# Patient Record
Sex: FEMALE | Race: WHITE | NOT HISPANIC OR LATINO | URBAN - METROPOLITAN AREA
[De-identification: names, ages, dates, MRNs, and addresses within clinical notes are randomized per-mention and may not be internally consistent; named-entity substitution may affect disease eponyms.]

---

## 2022-07-10 ENCOUNTER — EMERGENCY (EMERGENCY)
Facility: HOSPITAL | Age: 62
LOS: 1 days | Discharge: HOME | End: 2022-07-10
Attending: EMERGENCY MEDICINE | Admitting: STUDENT IN AN ORGANIZED HEALTH CARE EDUCATION/TRAINING PROGRAM
Payer: COMMERCIAL

## 2022-07-10 VITALS
WEIGHT: 95.02 LBS | TEMPERATURE: 98 F | RESPIRATION RATE: 20 BRPM | DIASTOLIC BLOOD PRESSURE: 71 MMHG | HEART RATE: 72 BPM | OXYGEN SATURATION: 99 % | SYSTOLIC BLOOD PRESSURE: 104 MMHG | HEIGHT: 60 IN

## 2022-07-10 VITALS
TEMPERATURE: 98 F | DIASTOLIC BLOOD PRESSURE: 65 MMHG | HEART RATE: 73 BPM | RESPIRATION RATE: 18 BRPM | SYSTOLIC BLOOD PRESSURE: 100 MMHG | OXYGEN SATURATION: 100 %

## 2022-07-10 LAB
ALBUMIN SERPL ELPH-MCNC: 4.5 G/DL — SIGNIFICANT CHANGE UP (ref 3.3–5)
ALP SERPL-CCNC: 67 U/L — SIGNIFICANT CHANGE UP (ref 40–120)
ALT FLD-CCNC: 21 U/L — SIGNIFICANT CHANGE UP (ref 10–45)
ANION GAP SERPL CALC-SCNC: 10 MMOL/L — SIGNIFICANT CHANGE UP (ref 5–17)
AST SERPL-CCNC: 26 U/L — SIGNIFICANT CHANGE UP (ref 10–40)
BASOPHILS # BLD AUTO: 0.02 K/UL — SIGNIFICANT CHANGE UP (ref 0–0.2)
BASOPHILS NFR BLD AUTO: 0.3 % — SIGNIFICANT CHANGE UP (ref 0–2)
BILIRUB SERPL-MCNC: 0.3 MG/DL — SIGNIFICANT CHANGE UP (ref 0.2–1.2)
BUN SERPL-MCNC: 17 MG/DL — SIGNIFICANT CHANGE UP (ref 7–23)
CALCIUM SERPL-MCNC: 8.8 MG/DL — SIGNIFICANT CHANGE UP (ref 8.4–10.5)
CHLORIDE SERPL-SCNC: 105 MMOL/L — SIGNIFICANT CHANGE UP (ref 96–108)
CO2 SERPL-SCNC: 24 MMOL/L — SIGNIFICANT CHANGE UP (ref 22–31)
CREAT SERPL-MCNC: 0.48 MG/DL — LOW (ref 0.5–1.3)
EGFR: 107 ML/MIN/1.73M2 — SIGNIFICANT CHANGE UP
EOSINOPHIL # BLD AUTO: 0.03 K/UL — SIGNIFICANT CHANGE UP (ref 0–0.5)
EOSINOPHIL NFR BLD AUTO: 0.5 % — SIGNIFICANT CHANGE UP (ref 0–6)
GLUCOSE SERPL-MCNC: 91 MG/DL — SIGNIFICANT CHANGE UP (ref 70–99)
HCT VFR BLD CALC: 38.3 % — SIGNIFICANT CHANGE UP (ref 34.5–45)
HGB BLD-MCNC: 12.7 G/DL — SIGNIFICANT CHANGE UP (ref 11.5–15.5)
IMM GRANULOCYTES NFR BLD AUTO: 0.3 % — SIGNIFICANT CHANGE UP (ref 0–1.5)
LYMPHOCYTES # BLD AUTO: 1 K/UL — SIGNIFICANT CHANGE UP (ref 1–3.3)
LYMPHOCYTES # BLD AUTO: 15.3 % — SIGNIFICANT CHANGE UP (ref 13–44)
MAGNESIUM SERPL-MCNC: 2.2 MG/DL — SIGNIFICANT CHANGE UP (ref 1.6–2.6)
MCHC RBC-ENTMCNC: 29.2 PG — SIGNIFICANT CHANGE UP (ref 27–34)
MCHC RBC-ENTMCNC: 33.2 GM/DL — SIGNIFICANT CHANGE UP (ref 32–36)
MCV RBC AUTO: 88 FL — SIGNIFICANT CHANGE UP (ref 80–100)
MONOCYTES # BLD AUTO: 0.23 K/UL — SIGNIFICANT CHANGE UP (ref 0–0.9)
MONOCYTES NFR BLD AUTO: 3.5 % — SIGNIFICANT CHANGE UP (ref 2–14)
NEUTROPHILS # BLD AUTO: 5.22 K/UL — SIGNIFICANT CHANGE UP (ref 1.8–7.4)
NEUTROPHILS NFR BLD AUTO: 80.1 % — HIGH (ref 43–77)
NRBC # BLD: 0 /100 WBCS — SIGNIFICANT CHANGE UP (ref 0–0)
PHOSPHATE SERPL-MCNC: 3.5 MG/DL — SIGNIFICANT CHANGE UP (ref 2.5–4.5)
PLATELET # BLD AUTO: 223 K/UL — SIGNIFICANT CHANGE UP (ref 150–400)
POTASSIUM SERPL-MCNC: 4 MMOL/L — SIGNIFICANT CHANGE UP (ref 3.5–5.3)
POTASSIUM SERPL-SCNC: 4 MMOL/L — SIGNIFICANT CHANGE UP (ref 3.5–5.3)
PROT SERPL-MCNC: 6.8 G/DL — SIGNIFICANT CHANGE UP (ref 6–8.3)
RBC # BLD: 4.35 M/UL — SIGNIFICANT CHANGE UP (ref 3.8–5.2)
RBC # FLD: 12.8 % — SIGNIFICANT CHANGE UP (ref 10.3–14.5)
SODIUM SERPL-SCNC: 139 MMOL/L — SIGNIFICANT CHANGE UP (ref 135–145)
WBC # BLD: 6.52 K/UL — SIGNIFICANT CHANGE UP (ref 3.8–10.5)
WBC # FLD AUTO: 6.52 K/UL — SIGNIFICANT CHANGE UP (ref 3.8–10.5)

## 2022-07-10 PROCEDURE — 93005 ELECTROCARDIOGRAM TRACING: CPT

## 2022-07-10 PROCEDURE — 85025 COMPLETE CBC W/AUTO DIFF WBC: CPT

## 2022-07-10 PROCEDURE — 80053 COMPREHEN METABOLIC PANEL: CPT

## 2022-07-10 PROCEDURE — 84100 ASSAY OF PHOSPHORUS: CPT

## 2022-07-10 PROCEDURE — 99284 EMERGENCY DEPT VISIT MOD MDM: CPT

## 2022-07-10 PROCEDURE — 82962 GLUCOSE BLOOD TEST: CPT

## 2022-07-10 PROCEDURE — 83735 ASSAY OF MAGNESIUM: CPT

## 2022-07-10 RX ORDER — SODIUM CHLORIDE 9 MG/ML
1000 INJECTION INTRAMUSCULAR; INTRAVENOUS; SUBCUTANEOUS ONCE
Refills: 0 | Status: COMPLETED | OUTPATIENT
Start: 2022-07-10 | End: 2022-07-10

## 2022-07-10 RX ORDER — MECLIZINE HCL 12.5 MG
1 TABLET ORAL
Qty: 5 | Refills: 0
Start: 2022-07-10 | End: 2022-07-14

## 2022-07-10 RX ORDER — MECLIZINE HCL 12.5 MG
50 TABLET ORAL ONCE
Refills: 0 | Status: COMPLETED | OUTPATIENT
Start: 2022-07-10 | End: 2022-07-10

## 2022-07-10 RX ADMIN — SODIUM CHLORIDE 1000 MILLILITER(S): 9 INJECTION INTRAMUSCULAR; INTRAVENOUS; SUBCUTANEOUS at 11:25

## 2022-07-10 RX ADMIN — Medication 50 MILLIGRAM(S): at 10:45

## 2022-07-10 NOTE — ED PROVIDER NOTE - NSFOLLOWUPINSTRUCTIONS_ED_ALL_ED_FT
You arrived to the hospital with dizziness. This improved with a medication called meclizine and fluids. We sent a small supply of meclizine to your pharmacy, which you can use as needed for vertigo. We have left information for you to follow up with our neurology and ENT clinics for further follow up and evaluation of your vertigo. If you start to experience persistent dizziness that does not improve with meclizine, numbness or weakness, your face appears to droop, or any other alarming symptoms, please return to the emergency room.

## 2022-07-10 NOTE — ED PROVIDER NOTE - ATTENDING CONTRIBUTION TO CARE
Nate Valente MD, FACEP: In this physician's medical judgement based on clinical history and physical exam: patient awakening and felt like she was woosy this am. No numbness or tingling, room was not spinning, patient has felt this a week ago and informed by her primary medical doctor she had a lower blood pressure and instructed to take more fluids with salt and lemon. Patient without chest pain, no nausea/vomiting, no ear pain, no unsteadiness. Patient states she still feels as if she has symptoms. Patient's symptoms not exacerbated by head movement.   ncat  no nystagmus  no symptoms upon head movement  normal finger to nose, normal alternating movements  mildly dry mm  orthostatics within normal limits   no gross deformity of extremities, no asymmetry  5/5 str to bilateral UE and LE, normal sensory to bilateral UE and LE  non-tachycardic  non-tachypneic  no murmurs  with capacity and insight   anxious about health  Nate Valente MD, FACEP: In this physician's medical judgement based on clinical history and physical exam: patient signs and symptoms of dehydration vs bppv, will get iv, cbc, cmp, mg, phos, ekg, will give meclizine and fluids  patient asking about ct imaging of head for her concerns and educated the most appropriate therapy is initial treatment and follow up imaging with primary medical doctor and/or neurology if necessary down the road for MRI of head should her symptoms persist. Nate Valente MD, FACEP: In this physician's medical judgement based on clinical history and physical exam: patient awakening and felt like she was woosy this am. No numbness or tingling, room was not spinning, patient has felt this a week ago and informed by her primary medical doctor she had a lower blood pressure and instructed to take more fluids with salt and lemon. Patient without chest pain, no nausea/vomiting, no ear pain, no unsteadiness. Patient states she still feels as if she has symptoms. Patient's symptoms not exacerbated by head movement.   ncat  no nystagmus  no symptoms upon head movement  normal finger to nose, normal alternating movements  mildly dry mm  orthostatics within normal limits   no gross deformity of extremities, no asymmetry  5/5 str to bilateral UE and LE, normal sensory to bilateral UE and LE  non-tachycardic  non-tachypneic  no murmurs  with capacity and insight   anxious about health  Nate Valente MD, FACEP: In this physician's medical judgement based on clinical history and physical exam: patient signs and symptoms of dehydration vs bppv, will get iv, cbc, cmp, mg, phos, ekg, will give meclizine and fluids  patient asking about ct imaging of head for her concerns and educated the most appropriate therapy is initial treatment and follow up imaging with primary medical doctor and/or neurology if necessary down the road for MRI of head should her symptoms persist.  patient symptoms improved  The patient was serially evaluated throughout emergency department course by the team. There was no acute deterioration up to this time in the emergency department. The patient has demonstrated clinical improvement and/or stability, feels better at this time according to emergency department team. Agree with goals/plan of emergency department care as described in this physician's electronic medical record, including diagnostics, therapeutics and consultation recommendation as clinically warranted. Will discharge home with close outpatient follow up with primary care physician/provider and specialist if necessary. The patient and/or family was educated on expectant management and return precautions concerning signs and features to return to the emergency department, in layman terms, including but not limited to: nausea, vomiting, fever, chills, the inability to eat, take medications, or drink, persistent/worsening symptoms or any concerns at all. There are no acute or immediate life threatening issues present on history, clinical exam, or any diagnostic evaluation. The patient is a safe disposition home, has capacity and insight into their condition, is ambulatory in the Emergency Department with no further questions and will follow up with their doctor(s) this week. Diagnosis, prognosis, natural history and treatment was discussed with patient and/or family. The patient and/or family were given the opportunity to ask questions and have them answered in full. The patient and/or family are with capacity and insight into the situation, treatment, risks, benefits, alternative therapies, and understand that they can ask any further questions if needed. Patient and/or family/guardian understands anticipatory guidance and was given strict return and follow up precautions. The patient and/or family/guardian has been informed of the necessity to follow up with the PMD/Clinic/follow up as provided within 2-3 days, and the patient and/or family/guardian reports understanding of above with capacity and insight. The patient and/or family/guardian were informed of any results of their tests and are were encouraged to follow up on the findings with their doctor as well as the need to inform their doctor of any results. The patient and/or family/guardian are aware of the need to follow up with repeat testing as applicable and report understanding of the above with capacity and insight. The patient and/or family/guardian was made aware of any pending test results at the time of discharge and of the need to call back for the final results as well as the need to inform their doctor of the results.

## 2022-07-10 NOTE — ED PROVIDER NOTE - PATIENT PORTAL LINK FT
You can access the FollowMyHealth Patient Portal offered by [facility] by registering at the following website: [email or web address]. By joining [employer]’s FollowMyHealth portal, you will also be able to view your health information using other applications (apps) compatible with our system.

## 2022-07-10 NOTE — ED PROVIDER NOTE - NS ED ROS FT
REVIEW OF SYSTEMS:    CONSTITUTIONAL: No weakness, fevers or chills  EYES: No visual changes; no sclera icterics, no pain or drainage  ENT:  +vertigo, no throat pain  NECK: No pain or stiffness  RESPIRATORY: No cough, wheezing, hemoptysis; No shortness of breath  CARDIOVASCULAR: No chest pain or palpitations  GASTROINTESTINAL: No abdominal or epigastric pain. +one episode vomiting; No diarrhea or constipation. No melena or hematochezia.  GENITOURINARY: No dysuria, frequency or hematuria  NEUROLOGICAL: No numbness or weakness  SKIN: No itching, rashes  Psych: No anxiety or depression

## 2022-07-10 NOTE — ED PROVIDER NOTE - OBJECTIVE STATEMENT
62 yo woman with no significant PMH awakening today and felt like she was woosy this am. No numbness or tingling, room was not spinning, patient has felt this a week ago and informed by her primary medical doctor she had a lower blood pressure and instructed to take more fluids with salt and lemon. Patient without chest pain, no nausea/vomiting, no ear pain, no unsteadiness. Patient's symptoms not exacerbated by head movement.

## 2022-07-10 NOTE — ED PROVIDER NOTE - NSFOLLOWUPCLINICS_GEN_ALL_ED_FT
[facility] - ENT  Otolaryngology (ENT)  [address] [place] [address]  Phone: [phone]  Fax:     [facility]  Neurology  [address] [place] [id number]  Phone: [phone]  Fax:

## 2022-07-10 NOTE — ED PROVIDER NOTE - PHYSICAL EXAMINATION
LOS:     VITALS:   T(C): 36.7 (07-10-22 @ 11:31), Max: 36.7 (07-10-22 @ 11:31)  HR: 63 (07-10-22 @ 11:31) (63 - 72)  BP: 99/54 (07-10-22 @ 11:31) (99/54 - 104/71)  RR: 18 (07-10-22 @ 11:31) (18 - 20)  SpO2: 98% (07-10-22 @ 11:31) (98% - 99%)    GENERAL: anxious, though non-toxic  HEAD:  Atraumatic, Normocephalic  EYES: EOMI, PERRLA, conjunctiva and sclera clear, no nystagmus  ENT: Moist mucous membranes  NECK: Supple, No JVD  CHEST/LUNG: Clear to auscultation bilaterally; No rales, rhonchi, wheezing, or rubs. Unlabored respirations  HEART: Regular rate and rhythm; No murmurs, rubs, or gallops  ABDOMEN: BSx4; Soft, nontender, nondistended  EXTREMITIES:  2+ Peripheral Pulses, brisk capillary refill. No clubbing, cyanosis, or edema  NERVOUS SYSTEM:  A&Ox3, no focal deficits, no nystagmus, finger to nose test normal, no dysdiadochokinesia  SKIN: No rashes or lesions

## 2022-07-10 NOTE — ED PROVIDER NOTE - CARE PROVIDER_API CALL
Erwin Sloan)  Internal Medicine; Pulmonary Disease  [address] [place] [address]  Phone: [phone]  Fax: [phone]  Follow Up Time:

## 2022-07-10 NOTE — ED ADULT NURSE NOTE - OBJECTIVE STATEMENT
62 y/o female presents to ED reporting dizziness. Patient reports having dizziness since this morning and one episode of vomiting. Patient reports having a similar episode several days ago. Patient reports feeling like she is on a boat. On exam, AOx3, speaking in complete sentences. Unlabored, spontaneous respirations, NAD. Abdomen soft, non-tender, non-distended. Pt denies headache, nausea, CP, SOB at this time. MD at bedside to evaluate pt.

## 2022-07-10 NOTE — ED PROVIDER NOTE - CLINICAL SUMMARY MEDICAL DECISION MAKING FREE TEXT BOX
62 yo woman with no significant PMH awakening today and felt like she was woosy this am. No numbness or tingling, room was not spinning, patient has felt this a week ago and informed by her primary medical doctor she had a lower blood pressure and instructed to take more fluids with salt and lemon. Patient without chest pain, no nausea/vomiting, no ear pain, no unsteadiness. Patient's symptoms not exacerbated by head movement. Patient reported significant improvement with IV fluids and meclizine. Feels comfortable going home with ENT and neurology follow up.

## 2024-11-01 ENCOUNTER — NON-APPOINTMENT (OUTPATIENT)
Age: 64
End: 2024-11-01

## 2024-11-01 ENCOUNTER — APPOINTMENT (OUTPATIENT)
Age: 64
End: 2024-11-01
Payer: COMMERCIAL

## 2024-11-01 PROCEDURE — 92020 GONIOSCOPY: CPT

## 2024-11-01 PROCEDURE — 92002 INTRM OPH EXAM NEW PATIENT: CPT

## 2024-11-09 ENCOUNTER — NON-APPOINTMENT (OUTPATIENT)
Age: 64
End: 2024-11-09

## 2024-11-11 PROBLEM — Z00.00 ENCOUNTER FOR PREVENTIVE HEALTH EXAMINATION: Status: ACTIVE | Noted: 2024-11-11

## 2025-08-10 ENCOUNTER — NON-APPOINTMENT (OUTPATIENT)
Age: 65
End: 2025-08-10

## 2025-09-21 ENCOUNTER — NON-APPOINTMENT (OUTPATIENT)
Age: 65
End: 2025-09-21